# Patient Record
Sex: MALE | Race: WHITE | Employment: OTHER | URBAN - METROPOLITAN AREA
[De-identification: names, ages, dates, MRNs, and addresses within clinical notes are randomized per-mention and may not be internally consistent; named-entity substitution may affect disease eponyms.]

---

## 2018-05-11 ENCOUNTER — OFFICE VISIT (OUTPATIENT)
Dept: NEUROLOGY | Age: 53
End: 2018-05-11
Payer: COMMERCIAL

## 2018-05-11 VITALS
SYSTOLIC BLOOD PRESSURE: 129 MMHG | OXYGEN SATURATION: 94 % | BODY MASS INDEX: 33.19 KG/M2 | TEMPERATURE: 97.8 F | RESPIRATION RATE: 18 BRPM | DIASTOLIC BLOOD PRESSURE: 86 MMHG | WEIGHT: 219 LBS | HEIGHT: 68 IN | HEART RATE: 89 BPM

## 2018-05-11 DIAGNOSIS — G35 MS (MULTIPLE SCLEROSIS) (HCC): Primary | ICD-10-CM

## 2018-05-11 PROCEDURE — 3017F COLORECTAL CA SCREEN DOC REV: CPT | Performed by: PSYCHIATRY & NEUROLOGY

## 2018-05-11 PROCEDURE — 1036F TOBACCO NON-USER: CPT | Performed by: PSYCHIATRY & NEUROLOGY

## 2018-05-11 PROCEDURE — G8417 CALC BMI ABV UP PARAM F/U: HCPCS | Performed by: PSYCHIATRY & NEUROLOGY

## 2018-05-11 PROCEDURE — G8427 DOCREV CUR MEDS BY ELIG CLIN: HCPCS | Performed by: PSYCHIATRY & NEUROLOGY

## 2018-05-11 PROCEDURE — 99214 OFFICE O/P EST MOD 30 MIN: CPT | Performed by: PSYCHIATRY & NEUROLOGY

## 2018-05-11 RX ORDER — LEVOCETIRIZINE DIHYDROCHLORIDE 5 MG/1
5 TABLET, FILM COATED ORAL NIGHTLY
COMMUNITY

## 2018-06-20 ENCOUNTER — TELEPHONE (OUTPATIENT)
Dept: NEUROLOGY | Age: 53
End: 2018-06-20

## 2018-07-11 ENCOUNTER — HOSPITAL ENCOUNTER (OUTPATIENT)
Age: 53
Discharge: HOME | End: 2018-07-11
Payer: COMMERCIAL

## 2018-07-11 DIAGNOSIS — G35 MS (MULTIPLE SCLEROSIS) (HCC): ICD-10-CM

## 2018-07-11 LAB
ALBUMIN SERPL-MCNC: 4.3 G/DL (ref 3.5–5.2)
ALP BLD-CCNC: 64 U/L (ref 40–129)
ALT SERPL-CCNC: 36 U/L (ref 0–40)
ANION GAP SERPL CALCULATED.3IONS-SCNC: 11 MMOL/L (ref 7–16)
AST SERPL-CCNC: 49 U/L (ref 0–39)
BASOPHILS ABSOLUTE: 0.05 E9/L (ref 0–0.2)
BASOPHILS RELATIVE PERCENT: 1 % (ref 0–2)
BILIRUB SERPL-MCNC: 1.5 MG/DL (ref 0–1.2)
BUN BLDV-MCNC: 8 MG/DL (ref 6–20)
CALCIUM SERPL-MCNC: 9.1 MG/DL (ref 8.6–10.2)
CHLORIDE BLD-SCNC: 101 MMOL/L (ref 98–107)
CO2: 28 MMOL/L (ref 22–29)
CREAT SERPL-MCNC: 0.7 MG/DL (ref 0.7–1.2)
EOSINOPHILS ABSOLUTE: 0.13 E9/L (ref 0.05–0.5)
EOSINOPHILS RELATIVE PERCENT: 2.7 % (ref 0–6)
GFR AFRICAN AMERICAN: >60
GFR NON-AFRICAN AMERICAN: >60 ML/MIN/1.73
GLUCOSE BLD-MCNC: 127 MG/DL (ref 74–109)
HCT VFR BLD CALC: 42.6 % (ref 37–54)
HEMOGLOBIN: 14.2 G/DL (ref 12.5–16.5)
IMMATURE GRANULOCYTES #: 0.04 E9/L
IMMATURE GRANULOCYTES %: 0.8 % (ref 0–5)
LYMPHOCYTES ABSOLUTE: 0.72 E9/L (ref 1.5–4)
LYMPHOCYTES RELATIVE PERCENT: 15 % (ref 20–42)
MCH RBC QN AUTO: 32.2 PG (ref 26–35)
MCHC RBC AUTO-ENTMCNC: 33.3 % (ref 32–34.5)
MCV RBC AUTO: 96.6 FL (ref 80–99.9)
MONOCYTES ABSOLUTE: 0.58 E9/L (ref 0.1–0.95)
MONOCYTES RELATIVE PERCENT: 12.1 % (ref 2–12)
NEUTROPHILS ABSOLUTE: 3.28 E9/L (ref 1.8–7.3)
NEUTROPHILS RELATIVE PERCENT: 68.4 % (ref 43–80)
PDW BLD-RTO: 13.1 FL (ref 11.5–15)
PLATELET # BLD: 138 E9/L (ref 130–450)
PMV BLD AUTO: 10.9 FL (ref 7–12)
POTASSIUM SERPL-SCNC: 4.2 MMOL/L (ref 3.5–5)
RBC # BLD: 4.41 E12/L (ref 3.8–5.8)
SODIUM BLD-SCNC: 140 MMOL/L (ref 132–146)
TOTAL PROTEIN: 7.3 G/DL (ref 6.4–8.3)
WBC # BLD: 4.8 E9/L (ref 4.5–11.5)

## 2018-07-11 PROCEDURE — 80053 COMPREHEN METABOLIC PANEL: CPT

## 2018-07-11 PROCEDURE — 36415 COLL VENOUS BLD VENIPUNCTURE: CPT

## 2018-07-11 PROCEDURE — 85025 COMPLETE CBC W/AUTO DIFF WBC: CPT

## 2018-11-15 ENCOUNTER — OFFICE VISIT (OUTPATIENT)
Dept: NEUROLOGY | Age: 53
End: 2018-11-15
Payer: COMMERCIAL

## 2018-11-15 VITALS
DIASTOLIC BLOOD PRESSURE: 71 MMHG | OXYGEN SATURATION: 96 % | TEMPERATURE: 96.3 F | HEART RATE: 82 BPM | BODY MASS INDEX: 35.16 KG/M2 | RESPIRATION RATE: 18 BRPM | SYSTOLIC BLOOD PRESSURE: 127 MMHG | HEIGHT: 67 IN | WEIGHT: 224 LBS

## 2018-11-15 DIAGNOSIS — G35 MS (MULTIPLE SCLEROSIS) (HCC): Primary | ICD-10-CM

## 2018-11-15 PROCEDURE — G8427 DOCREV CUR MEDS BY ELIG CLIN: HCPCS | Performed by: PSYCHIATRY & NEUROLOGY

## 2018-11-15 PROCEDURE — G8417 CALC BMI ABV UP PARAM F/U: HCPCS | Performed by: PSYCHIATRY & NEUROLOGY

## 2018-11-15 PROCEDURE — 1036F TOBACCO NON-USER: CPT | Performed by: PSYCHIATRY & NEUROLOGY

## 2018-11-15 PROCEDURE — 99214 OFFICE O/P EST MOD 30 MIN: CPT | Performed by: PSYCHIATRY & NEUROLOGY

## 2018-11-15 PROCEDURE — G8484 FLU IMMUNIZE NO ADMIN: HCPCS | Performed by: PSYCHIATRY & NEUROLOGY

## 2018-11-15 PROCEDURE — 3017F COLORECTAL CA SCREEN DOC REV: CPT | Performed by: PSYCHIATRY & NEUROLOGY

## 2018-12-03 ENCOUNTER — TELEPHONE (OUTPATIENT)
Dept: NEUROLOGY | Age: 53
End: 2018-12-03

## 2018-12-14 ENCOUNTER — TELEPHONE (OUTPATIENT)
Dept: NEUROLOGY | Age: 53
End: 2018-12-14

## 2018-12-14 DIAGNOSIS — G35 MS (MULTIPLE SCLEROSIS) (HCC): ICD-10-CM

## 2018-12-14 DIAGNOSIS — R26.81 UNSTEADINESS: Primary | ICD-10-CM

## 2018-12-19 NOTE — TELEPHONE ENCOUNTER
Pt to use [facility] On [address] Ph [phone]. Order will be faxed to [phone].   Electronically signed by Felipe Ackerman on 12/19/18 at 11:51 AM

## 2019-01-09 ENCOUNTER — HOSPITAL ENCOUNTER (OUTPATIENT)
Dept: MRI IMAGING | Age: 54
Discharge: HOME | End: 2019-01-11
Payer: COMMERCIAL

## 2019-01-09 DIAGNOSIS — G35 MS (MULTIPLE SCLEROSIS) (HCC): ICD-10-CM

## 2019-01-09 PROCEDURE — 72156 MRI NECK SPINE W/O & W/DYE: CPT

## 2019-01-09 PROCEDURE — A9579 GAD-BASE MR CONTRAST NOS,1ML: HCPCS | Performed by: RADIOLOGY

## 2019-01-09 PROCEDURE — 6360000004 HC RX CONTRAST MEDICATION: Performed by: RADIOLOGY

## 2019-01-09 PROCEDURE — 70553 MRI BRAIN STEM W/O & W/DYE: CPT

## 2019-01-09 RX ADMIN — GADOTERIDOL 20 ML: 279.3 INJECTION, SOLUTION INTRAVENOUS at 16:58

## 2019-01-29 ENCOUNTER — HOSPITAL ENCOUNTER (OUTPATIENT)
Age: 54
Discharge: HOME | End: 2019-01-29
Payer: COMMERCIAL

## 2019-01-29 DIAGNOSIS — G35 MS (MULTIPLE SCLEROSIS) (HCC): ICD-10-CM

## 2019-01-29 LAB
ALBUMIN SERPL-MCNC: 4.4 G/DL (ref 3.5–5.2)
ALP BLD-CCNC: 64 U/L (ref 40–129)
ALT SERPL-CCNC: 62 U/L (ref 0–40)
ANION GAP SERPL CALCULATED.3IONS-SCNC: 15 MMOL/L (ref 7–16)
AST SERPL-CCNC: 73 U/L (ref 0–39)
BASOPHILS ABSOLUTE: 0.07 E9/L (ref 0–0.2)
BASOPHILS RELATIVE PERCENT: 1.3 % (ref 0–2)
BILIRUB SERPL-MCNC: 1 MG/DL (ref 0–1.2)
BUN BLDV-MCNC: 12 MG/DL (ref 6–20)
CALCIUM SERPL-MCNC: 9.3 MG/DL (ref 8.6–10.2)
CHLORIDE BLD-SCNC: 99 MMOL/L (ref 98–107)
CO2: 24 MMOL/L (ref 22–29)
CREAT SERPL-MCNC: 0.8 MG/DL (ref 0.7–1.2)
EOSINOPHILS ABSOLUTE: 0.14 E9/L (ref 0.05–0.5)
EOSINOPHILS RELATIVE PERCENT: 2.6 % (ref 0–6)
GFR AFRICAN AMERICAN: >60
GFR NON-AFRICAN AMERICAN: >60 ML/MIN/1.73
GLUCOSE BLD-MCNC: 90 MG/DL (ref 74–99)
HCT VFR BLD CALC: 45.1 % (ref 37–54)
HEMOGLOBIN: 15.3 G/DL (ref 12.5–16.5)
IMMATURE GRANULOCYTES #: 0.02 E9/L
IMMATURE GRANULOCYTES %: 0.4 % (ref 0–5)
LYMPHOCYTES ABSOLUTE: 0.87 E9/L (ref 1.5–4)
LYMPHOCYTES RELATIVE PERCENT: 15.9 % (ref 20–42)
MCH RBC QN AUTO: 33.6 PG (ref 26–35)
MCHC RBC AUTO-ENTMCNC: 33.9 % (ref 32–34.5)
MCV RBC AUTO: 98.9 FL (ref 80–99.9)
MONOCYTES ABSOLUTE: 0.83 E9/L (ref 0.1–0.95)
MONOCYTES RELATIVE PERCENT: 15.2 % (ref 2–12)
NEUTROPHILS ABSOLUTE: 3.53 E9/L (ref 1.8–7.3)
NEUTROPHILS RELATIVE PERCENT: 64.6 % (ref 43–80)
PDW BLD-RTO: 13.4 FL (ref 11.5–15)
PLATELET # BLD: 190 E9/L (ref 130–450)
PMV BLD AUTO: 10.4 FL (ref 7–12)
POTASSIUM SERPL-SCNC: 4.4 MMOL/L (ref 3.5–5)
RBC # BLD: 4.56 E12/L (ref 3.8–5.8)
SODIUM BLD-SCNC: 138 MMOL/L (ref 132–146)
TOTAL PROTEIN: 7.3 G/DL (ref 6.4–8.3)
WBC # BLD: 5.5 E9/L (ref 4.5–11.5)

## 2019-01-29 PROCEDURE — 85025 COMPLETE CBC W/AUTO DIFF WBC: CPT

## 2019-01-29 PROCEDURE — 36415 COLL VENOUS BLD VENIPUNCTURE: CPT

## 2019-01-29 PROCEDURE — 80053 COMPREHEN METABOLIC PANEL: CPT

## 2019-06-04 ENCOUNTER — OFFICE VISIT (OUTPATIENT)
Dept: NEUROLOGY | Age: 54
End: 2019-06-04
Payer: COMMERCIAL

## 2019-06-04 VITALS
RESPIRATION RATE: 18 BRPM | HEIGHT: 67 IN | HEART RATE: 88 BPM | DIASTOLIC BLOOD PRESSURE: 83 MMHG | SYSTOLIC BLOOD PRESSURE: 136 MMHG | WEIGHT: 222 LBS | BODY MASS INDEX: 34.84 KG/M2 | OXYGEN SATURATION: 97 % | TEMPERATURE: 97 F

## 2019-06-04 DIAGNOSIS — G35 MS (MULTIPLE SCLEROSIS) (HCC): Primary | ICD-10-CM

## 2019-06-04 PROCEDURE — 3017F COLORECTAL CA SCREEN DOC REV: CPT | Performed by: PSYCHIATRY & NEUROLOGY

## 2019-06-04 PROCEDURE — G8417 CALC BMI ABV UP PARAM F/U: HCPCS | Performed by: PSYCHIATRY & NEUROLOGY

## 2019-06-04 PROCEDURE — 1036F TOBACCO NON-USER: CPT | Performed by: PSYCHIATRY & NEUROLOGY

## 2019-06-04 PROCEDURE — G8427 DOCREV CUR MEDS BY ELIG CLIN: HCPCS | Performed by: PSYCHIATRY & NEUROLOGY

## 2019-06-04 PROCEDURE — 99214 OFFICE O/P EST MOD 30 MIN: CPT | Performed by: PSYCHIATRY & NEUROLOGY

## 2019-06-04 NOTE — PROGRESS NOTES
Jimmie Cowden is a 48 y.o. 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 He remains an excellent historian. He continues well controlled on Tecfidera 240 mg twice daily. He denies any issues with that drug. He still displays primarily spastic paraparesis. He again reports no additional weakness, numbness, pains or other neurological deficits. He has not used a cane in several years. He again denies any tripping or falling. He is not taking baclofen. Medically he reports no additional issues. He is eating and sleeping well. He remains good spirits. The patient remains active and still exercises regularly. Review of systems is otherwise unremarkable. Objective:     /83 (Site: Left Upper Arm, Position: Sitting, Cuff Size: Medium Adult)   Pulse 88   Temp 97 °F (36.1 °C) (Oral)   Resp 18   Ht 5' 7\" (1.702 m)   Wt 222 lb (100.7 kg)   SpO2 97%   BMI 34.77 kg/m²    Afebrile     Head: Unremarkable   Neck: No adenopathy, no carotid bruit, no JVD, supple, symmetrical, trachea midline and thyroid not enlarged, symmetric, no tenderness/mass/nodules  Lungs: Clear to auscultation   Heart: Regular rate and rhythm, S1, S2 normal, no murmurs, click, rub or gallop  Extremities: Atrophy of both legs  Skin: Color, texture, turgor normal; no rashes or lesions     Mental Status: Alert, oriented, thought content appropriate---with intact memories; he remains pleasant  Speech:  Without dysarthria or aphasia    Cranial Nerves:  I: smell    II: visual acuity     II: visual fields Full to confrontation   II: pupils MARIZOL   III,VII: ptosis None   III,IV,VI: extraocular muscles  Full ROM   V: mastication Normal   V: facial light touch sensation  Normal   V,VII: corneal reflex  Present   VII: facial muscle function - upper  Normal   VII: facial muscle function - lower Normal   VIII: hearing Normal   IX: soft palate elevation  Normal   IX,X: gag reflex Present   XI: trapezius strength  5/5   XI: sternocleidomastoid strength 5/5   XI: neck extension strength  5/5   XII: tongue strength  Normal     No red desaturation  No Daniel pupil  L optic disc pallor again    Motor:  5/5 throughout  Minimal spastic legs     Sensory:  Light touch and pinprick remain normal    Coordination:   Intact finger to nose, heel to shin and rapid alternating movements    Gait:  He displays a moderate spastic paraparetic gait    DTR:   BE reflexes in arms   Right Quadriceps reflex 2+/4  Left Quadriceps reflex 2+/4  Right Achilles reflex 0/4  Left Achilles reflex 0/4    His neurological examination remains unchanged    Laboratory/Radiology:     CBC with differential reveals an absolute lymphocyte count of 870  5 months ago    BERTIN virus was negative again    MRIs of his cervical spine revealed extensive lesion load with moderate degenerative joint disease and cord compression    MRIs of his brain revealed slightly increased T2 flair but no other abnormalities. Assessment:     The patient suffers from secondary chronic progressive MS --- his EDSS remains 6.0 ----from his spastic paraparesis. He continues well without using any assistive device or baclofen. I again find no changes on exam.  He will continue Tecfidera. Fortunately, there have been no additional falls. His MRIs are essentially unchanged. However, repeat MRIs of his brain or in order next year. His lumbosacral radiculopathy and carpal tunnel syndrome have not produced additional difficulties. Medically he remains stable. Plan:     He will continue Tecfidera at 240 mg twice daily     Repeat CBC with differential, BERTIN virus assay and CMP will be drawn    He will return in 6 months    He will call if any problems arise    I spent 29 minutes with the patient with over 50 % spent in counseling and disease management. All patient issues were addressed and all questions were answered.      Nithya Srivastava Jr  10:59 AM  6/4/2019

## 2019-09-18 ENCOUNTER — HOSPITAL ENCOUNTER (OUTPATIENT)
Age: 54
Discharge: HOME | End: 2019-09-18
Payer: COMMERCIAL

## 2019-09-18 DIAGNOSIS — G35 MS (MULTIPLE SCLEROSIS) (HCC): ICD-10-CM

## 2019-09-18 LAB
ALBUMIN SERPL-MCNC: 4.2 G/DL (ref 3.5–5.2)
ALP BLD-CCNC: 60 U/L (ref 40–129)
ALT SERPL-CCNC: 18 U/L (ref 0–40)
ANION GAP SERPL CALCULATED.3IONS-SCNC: 12 MMOL/L (ref 7–16)
AST SERPL-CCNC: 37 U/L (ref 0–39)
BASOPHILS ABSOLUTE: 0.05 E9/L (ref 0–0.2)
BASOPHILS RELATIVE PERCENT: 0.8 % (ref 0–2)
BILIRUB SERPL-MCNC: 1.1 MG/DL (ref 0–1.2)
BUN BLDV-MCNC: 10 MG/DL (ref 6–20)
CALCIUM SERPL-MCNC: 9.7 MG/DL (ref 8.6–10.2)
CHLORIDE BLD-SCNC: 103 MMOL/L (ref 98–107)
CO2: 27 MMOL/L (ref 22–29)
CREAT SERPL-MCNC: 0.8 MG/DL (ref 0.7–1.2)
EOSINOPHILS ABSOLUTE: 0.12 E9/L (ref 0.05–0.5)
EOSINOPHILS RELATIVE PERCENT: 2 % (ref 0–6)
GFR AFRICAN AMERICAN: >60
GFR NON-AFRICAN AMERICAN: >60 ML/MIN/1.73
GLUCOSE BLD-MCNC: 97 MG/DL (ref 74–99)
HCT VFR BLD CALC: 44.9 % (ref 37–54)
HEMOGLOBIN: 15 G/DL (ref 12.5–16.5)
IMMATURE GRANULOCYTES #: 0.06 E9/L
IMMATURE GRANULOCYTES %: 1 % (ref 0–5)
LYMPHOCYTES ABSOLUTE: 0.92 E9/L (ref 1.5–4)
LYMPHOCYTES RELATIVE PERCENT: 15.1 % (ref 20–42)
MCH RBC QN AUTO: 33 PG (ref 26–35)
MCHC RBC AUTO-ENTMCNC: 33.4 % (ref 32–34.5)
MCV RBC AUTO: 98.9 FL (ref 80–99.9)
MONOCYTES ABSOLUTE: 0.76 E9/L (ref 0.1–0.95)
MONOCYTES RELATIVE PERCENT: 12.5 % (ref 2–12)
NEUTROPHILS ABSOLUTE: 4.18 E9/L (ref 1.8–7.3)
NEUTROPHILS RELATIVE PERCENT: 68.6 % (ref 43–80)
PDW BLD-RTO: 13.3 FL (ref 11.5–15)
PLATELET # BLD: 171 E9/L (ref 130–450)
PMV BLD AUTO: 10.6 FL (ref 7–12)
POTASSIUM SERPL-SCNC: 4.4 MMOL/L (ref 3.5–5)
RBC # BLD: 4.54 E12/L (ref 3.8–5.8)
SODIUM BLD-SCNC: 142 MMOL/L (ref 132–146)
TOTAL PROTEIN: 7.6 G/DL (ref 6.4–8.3)
WBC # BLD: 6.1 E9/L (ref 4.5–11.5)

## 2019-09-18 PROCEDURE — 36415 COLL VENOUS BLD VENIPUNCTURE: CPT

## 2019-09-18 PROCEDURE — 85025 COMPLETE CBC W/AUTO DIFF WBC: CPT

## 2019-09-18 PROCEDURE — 80053 COMPREHEN METABOLIC PANEL: CPT

## 2019-09-18 PROCEDURE — 87798 DETECT AGENT NOS DNA AMP: CPT

## 2019-09-24 LAB
Lab: NORMAL
REPORT: NORMAL
THIS TEST SENT TO: NORMAL

## 2019-12-03 ENCOUNTER — OFFICE VISIT (OUTPATIENT)
Dept: NEUROLOGY | Age: 54
End: 2019-12-03
Payer: COMMERCIAL

## 2019-12-03 VITALS
RESPIRATION RATE: 17 BRPM | SYSTOLIC BLOOD PRESSURE: 136 MMHG | DIASTOLIC BLOOD PRESSURE: 87 MMHG | BODY MASS INDEX: 29.82 KG/M2 | WEIGHT: 190 LBS | OXYGEN SATURATION: 96 % | HEART RATE: 92 BPM | HEIGHT: 67 IN

## 2019-12-03 DIAGNOSIS — G35 MULTIPLE SCLEROSIS (HCC): ICD-10-CM

## 2019-12-03 PROCEDURE — 1036F TOBACCO NON-USER: CPT | Performed by: PSYCHIATRY & NEUROLOGY

## 2019-12-03 PROCEDURE — G8484 FLU IMMUNIZE NO ADMIN: HCPCS | Performed by: PSYCHIATRY & NEUROLOGY

## 2019-12-03 PROCEDURE — 99214 OFFICE O/P EST MOD 30 MIN: CPT | Performed by: PSYCHIATRY & NEUROLOGY

## 2019-12-03 PROCEDURE — G8417 CALC BMI ABV UP PARAM F/U: HCPCS | Performed by: PSYCHIATRY & NEUROLOGY

## 2019-12-03 PROCEDURE — G8427 DOCREV CUR MEDS BY ELIG CLIN: HCPCS | Performed by: PSYCHIATRY & NEUROLOGY

## 2019-12-03 PROCEDURE — 3017F COLORECTAL CA SCREEN DOC REV: CPT | Performed by: PSYCHIATRY & NEUROLOGY

## 2019-12-03 RX ORDER — DIMETHYL FUMARATE 240 MG/1
CAPSULE ORAL
Qty: 180 CAPSULE | Refills: 3 | Status: SHIPPED
Start: 2019-12-03 | End: 2020-06-08 | Stop reason: SDUPTHER

## 2020-02-20 ENCOUNTER — HOSPITAL ENCOUNTER (EMERGENCY)
Age: 55
Discharge: HOME | End: 2020-02-20
Attending: FAMILY MEDICINE
Payer: COMMERCIAL

## 2020-02-20 VITALS
HEART RATE: 104 BPM | RESPIRATION RATE: 16 BRPM | WEIGHT: 200 LBS | HEIGHT: 66 IN | BODY MASS INDEX: 32.14 KG/M2 | SYSTOLIC BLOOD PRESSURE: 130 MMHG | TEMPERATURE: 98 F | OXYGEN SATURATION: 98 % | DIASTOLIC BLOOD PRESSURE: 82 MMHG

## 2020-02-20 PROCEDURE — 99282 EMERGENCY DEPT VISIT SF MDM: CPT

## 2020-02-20 RX ORDER — ACETAMINOPHEN 500 MG
1000 TABLET ORAL EVERY 8 HOURS PRN
Qty: 50 TABLET | Refills: 0 | Status: SHIPPED | OUTPATIENT
Start: 2020-02-20 | End: 2020-12-08 | Stop reason: SDUPTHER

## 2020-02-20 RX ORDER — AMOXICILLIN AND CLAVULANATE POTASSIUM 875; 125 MG/1; MG/1
1 TABLET, FILM COATED ORAL 2 TIMES DAILY
Qty: 20 TABLET | Refills: 0 | Status: SHIPPED | OUTPATIENT
Start: 2020-02-20 | End: 2020-03-01

## 2020-02-20 RX ORDER — ACETAMINOPHEN 500 MG
500 TABLET ORAL ONCE
COMMUNITY

## 2020-02-20 RX ORDER — IBUPROFEN 400 MG/1
400 TABLET ORAL EVERY 8 HOURS PRN
Qty: 12 TABLET | Refills: 0 | Status: SHIPPED | OUTPATIENT
Start: 2020-02-20

## 2020-02-20 ASSESSMENT — PAIN DESCRIPTION - ONSET: ONSET: ON-GOING

## 2020-02-20 ASSESSMENT — PAIN DESCRIPTION - PAIN TYPE: TYPE: ACUTE PAIN

## 2020-02-20 ASSESSMENT — PAIN DESCRIPTION - LOCATION: LOCATION: EAR

## 2020-02-20 ASSESSMENT — PAIN DESCRIPTION - ORIENTATION: ORIENTATION: LEFT

## 2020-02-20 ASSESSMENT — PAIN SCALES - GENERAL: PAINLEVEL_OUTOF10: 9

## 2020-02-20 ASSESSMENT — PAIN DESCRIPTION - DESCRIPTORS: DESCRIPTORS: CONSTANT

## 2020-02-20 NOTE — ED PROVIDER NOTES
Department of Emergency Medicine   ED  Provider Note  Admit Date/RoomTime: 2/20/2020  9:33 AM  ED Room: 08/08    Chief Complaint   Facial Pain (left ear/ facial pain- ongoing x 1 month- pain more severe through last night)    History of Present Illness   Source of history provided by:  patient and relative(s). History/Exam Limitations: none. Barbra Mortimer is a 47 y.o. old male who has a past medical history of:   Past Medical History:   Diagnosis Date    Multiple sclerosis ([facility] [address]     Vision abnormalities     presents to the emergency department by private vehicle and ambulatory, for left lower jaw and ear  pain, which occured 1 month(s) prior to arrival.  Since onset the symptoms have been constant and gradually worsening and moderate in severity. Worsened by  chewing and improved by nothing. Associated Signs & Symptoms:  Facial pain. Onset:       Spontaneous:   yes. Following Trauma:   no.     Previous Caries:   no.     Recent Dental Procedure:   no.     ROS    Pertinent positives and negatives are stated within HPI, all other systems reviewed and are negative. .    Past Surgical History:  has a past surgical history that includes Wrist surgery. Social History:  reports that he has never smoked. He has never used smokeless tobacco. He reports previous alcohol use. He reports that he does not use drugs. Family History: family history includes Cancer in his mother; High Blood Pressure in his mother. Allergies: Seasonal    Physical Exam           ED Triage Vitals   BP Temp Temp Source Pulse Resp SpO2 Height Weight   02/20/20 0918 02/20/20 0918 02/20/20 0918 02/20/20 0918 02/20/20 0918 02/20/20 0918 02/20/20 0956 02/20/20 0956   130/82 98 °F (36.7 °C) Temporal 110 16 98 % 5' 6\" (1.676 m) 200 lb (90.7 kg)      Oxygen Saturation Interpretation: Normal.    · Constitutional:  Alert, development consistent with age. · HEENT:  NC/NT. Airway patent. Ears normal bilaterally.    · Neck: Herminio Hernandez MD   DD: 2/20/20  **This report was transcribed using voice recognition software. Every effort was made to ensure accuracy; however, inadvertent computerized transcription errors may be present.   END OF ED PROVIDER NOTE        Bright Jones MD  02/20/20 [id number]

## 2020-06-02 ENCOUNTER — HOSPITAL ENCOUNTER (OUTPATIENT)
Age: 55
Discharge: HOME | End: 2020-06-02
Payer: COMMERCIAL

## 2020-06-02 LAB
ALBUMIN SERPL-MCNC: 4.9 G/DL (ref 3.5–5.2)
ALP BLD-CCNC: 69 U/L (ref 40–129)
ALT SERPL-CCNC: 24 U/L (ref 0–40)
ANION GAP SERPL CALCULATED.3IONS-SCNC: 15 MMOL/L (ref 7–16)
AST SERPL-CCNC: 39 U/L (ref 0–39)
BASOPHILS ABSOLUTE: 0.06 E9/L (ref 0–0.2)
BASOPHILS RELATIVE PERCENT: 0.9 % (ref 0–2)
BILIRUB SERPL-MCNC: 1.3 MG/DL (ref 0–1.2)
BUN BLDV-MCNC: 11 MG/DL (ref 6–20)
CALCIUM SERPL-MCNC: 10 MG/DL (ref 8.6–10.2)
CHLORIDE BLD-SCNC: 100 MMOL/L (ref 98–107)
CO2: 27 MMOL/L (ref 22–29)
CREAT SERPL-MCNC: 0.8 MG/DL (ref 0.7–1.2)
EOSINOPHILS ABSOLUTE: 0.14 E9/L (ref 0.05–0.5)
EOSINOPHILS RELATIVE PERCENT: 2.2 % (ref 0–6)
GFR AFRICAN AMERICAN: >60
GFR NON-AFRICAN AMERICAN: >60 ML/MIN/1.73
GLUCOSE BLD-MCNC: 93 MG/DL (ref 74–99)
HCT VFR BLD CALC: 48.2 % (ref 37–54)
HEMOGLOBIN: 16.5 G/DL (ref 12.5–16.5)
IMMATURE GRANULOCYTES #: 0.02 E9/L
IMMATURE GRANULOCYTES %: 0.3 % (ref 0–5)
LYMPHOCYTES ABSOLUTE: 0.97 E9/L (ref 1.5–4)
LYMPHOCYTES RELATIVE PERCENT: 15.1 % (ref 20–42)
MCH RBC QN AUTO: 34 PG (ref 26–35)
MCHC RBC AUTO-ENTMCNC: 34.2 % (ref 32–34.5)
MCV RBC AUTO: 99.2 FL (ref 80–99.9)
MONOCYTES ABSOLUTE: 0.66 E9/L (ref 0.1–0.95)
MONOCYTES RELATIVE PERCENT: 10.3 % (ref 2–12)
NEUTROPHILS ABSOLUTE: 4.56 E9/L (ref 1.8–7.3)
NEUTROPHILS RELATIVE PERCENT: 71.2 % (ref 43–80)
PDW BLD-RTO: 13.1 FL (ref 11.5–15)
PLATELET # BLD: 186 E9/L (ref 130–450)
PMV BLD AUTO: 10.8 FL (ref 7–12)
POTASSIUM SERPL-SCNC: 4.8 MMOL/L (ref 3.5–5)
RBC # BLD: 4.86 E12/L (ref 3.8–5.8)
SODIUM BLD-SCNC: 142 MMOL/L (ref 132–146)
TOTAL PROTEIN: 8.3 G/DL (ref 6.4–8.3)
WBC # BLD: 6.4 E9/L (ref 4.5–11.5)

## 2020-06-02 PROCEDURE — 80053 COMPREHEN METABOLIC PANEL: CPT

## 2020-06-02 PROCEDURE — 36415 COLL VENOUS BLD VENIPUNCTURE: CPT

## 2020-06-02 PROCEDURE — 85025 COMPLETE CBC W/AUTO DIFF WBC: CPT

## 2020-06-02 PROCEDURE — 87798 DETECT AGENT NOS DNA AMP: CPT

## 2020-06-08 ENCOUNTER — OFFICE VISIT (OUTPATIENT)
Dept: NEUROLOGY | Age: 55
End: 2020-06-08
Payer: COMMERCIAL

## 2020-06-08 VITALS — BODY MASS INDEX: 33.32 KG/M2 | TEMPERATURE: 97.4 F | WEIGHT: 200 LBS | HEIGHT: 65 IN | RESPIRATION RATE: 18 BRPM

## 2020-06-08 PROCEDURE — 99215 OFFICE O/P EST HI 40 MIN: CPT | Performed by: PSYCHIATRY & NEUROLOGY

## 2020-06-08 PROCEDURE — 1036F TOBACCO NON-USER: CPT | Performed by: PSYCHIATRY & NEUROLOGY

## 2020-06-08 PROCEDURE — G8427 DOCREV CUR MEDS BY ELIG CLIN: HCPCS | Performed by: PSYCHIATRY & NEUROLOGY

## 2020-06-08 PROCEDURE — 3017F COLORECTAL CA SCREEN DOC REV: CPT | Performed by: PSYCHIATRY & NEUROLOGY

## 2020-06-08 PROCEDURE — G8417 CALC BMI ABV UP PARAM F/U: HCPCS | Performed by: PSYCHIATRY & NEUROLOGY

## 2020-06-08 RX ORDER — DIMETHYL FUMARATE 240 MG/1
CAPSULE ORAL
Qty: 180 CAPSULE | Refills: 3 | Status: SHIPPED
Start: 2020-06-08 | End: 2021-01-19 | Stop reason: SDUPTHER

## 2020-06-10 LAB
Lab: NORMAL
REPORT: NORMAL
THIS TEST SENT TO: NORMAL

## 2020-10-14 ENCOUNTER — TELEPHONE (OUTPATIENT)
Dept: NEUROLOGY | Age: 55
End: 2020-10-14

## 2020-10-14 NOTE — TELEPHONE ENCOUNTER
Tecfidera approved 9/13/20-10/14/[facility]   Electronically signed by Markus Weeks on 10/14/20 at 8:47 AM EDT

## 2020-12-08 ENCOUNTER — VIRTUAL VISIT (OUTPATIENT)
Dept: NEUROLOGY | Age: 55
End: 2020-12-08
Payer: COMMERCIAL

## 2020-12-08 PROCEDURE — 99443 PR PHYS/QHP TELEPHONE EVALUATION 21-30 MIN: CPT | Performed by: PSYCHIATRY & NEUROLOGY

## 2020-12-08 RX ORDER — OXYBUTYNIN CHLORIDE 5 MG/1
TABLET ORAL
COMMUNITY
Start: 2020-11-27 | End: 2021-06-08

## 2020-12-08 NOTE — PROGRESS NOTES
Cathleen Sauceda is a 47 y.o. 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 He remains an excellent historian. This visit was by telephone. He continues well on Tecfidera 240 mg twice daily. Additional medications include oxybutynin, multivitamins, nasal spray, vitamin D and zinc.    He again denies any issues with dimethyl fumarate. He still displays spastic paraparesis, but reporting additional weakness, numbness, pains or other neurological deficits. There remain no cognitive deficits, speech or swallowing difficulties, headaches, other pains, incontinence or loss of consciousness. He has not used a cane in years. He has not fallen or tripped. He is no longer taking muscle relaxants. Medically he reports no issues. He continues eating and sleeping well. He remains in good spirits, despite the COVID-19 lockdown. He is practicing proper precautions. The patient remains active outside the home. He continues fatiguing in the evening hours. Review of systems is otherwise unremarkable. Objective:     He is afebrile and in no acute distress. He is breathing comfortably, without chest pain or shortness of breath. He denies any chest palpitations. He is alert, cooperative and very pleasant. His skin is unremarkable. His abdomen is soft and nontender. His limbs revealed no abnormalities. Neurological examination reveals an intact mental status. All memories are intact; I find no dysarthria or aphasia. He observes no cranial nerve abnormalities. He still notes some spasticity in his legs, but 5/5 strength throughout. There were no abnormal movements. He appreciates light touch in all limbs. He still walks with his spastic paraparetic gait. His neurological examination is reportedly unchanged. Laboratory/Radiology:     CBC with differential reveals an absolute lymphocyte count of 970 6 months ago. BERTIN virus was negative 6 months ago.     MRIs of his cervical spine revealed extensive lesion load with moderate degenerative joint disease and cord compression. MRIs of his brain revealed slightly increased T2 flair but no other abnormalities. Assessment:     The patient suffers from secondary chronic progressive MS --- his EDSS remains 6.0 ----with his spastic paraparesis. He continues well without assistive devices or muscle relaxants. He denies any changes. He will continue on Tecfidera. Fortunately, there have been no additional falls. His past MRIs are unchanged. We will repeat routine laboratory data and BERTIN virus assays. His lumbosacral radiculopathy and carpal tunnel syndrome remain asymptomatic. Medically he remains stable. Plan:     He will continue Tecfidera at 240 mg twice daily. BERTIN virus assay, CBC with differential and CMP were ordered    He will continue exercising as best he can during the lockdown    He will return in 6 months. He will call if any problems arise. I spent 30 minutes with the patient with over 50 % spent in counseling and disease management. All patient issues were addressed and all questions were answered. Ada Hess is a 47 y.o. male evaluated via telephone on 12/8/2020. Consent:  He and/or health care decision maker is aware that that he may receive a bill for this telephone service, depending on his insurance coverage, and has provided verbal consent to proceed--- yes. Documentation:  I communicated with the patient and/or health care decision maker about his multiple sclerosis. Details of this discussion including any medical advice provided per telephone. I affirmed this is a Patient Initiated Episode with an Established Patient who has not had a related appointment within my department in the past 7 days or scheduled within the next 24 hours. Again I spent 30 minutes with the patient.       Marbella Mo MD

## 2020-12-08 NOTE — PROGRESS NOTES
Ale Perea was read the following message We want to confirm that, for purposes of billing, this is a virtual visit with your provider for which we will submit a claim for reimbursement with your insurance company. You will be responsible for any copays, coinsurance amounts or other amounts not covered by your insurance company. If you do not accept this, unfortunately we will not be able to schedule or proceed with a virtual visit with the provider. Do you accept? Cedric Cortez responded Yes .

## 2021-01-19 DIAGNOSIS — G35 MULTIPLE SCLEROSIS (HCC): ICD-10-CM

## 2021-01-19 RX ORDER — DIMETHYL FUMARATE 240 MG/1
CAPSULE ORAL
Qty: 180 CAPSULE | Refills: 3 | Status: SHIPPED
Start: 2021-01-19 | End: 2021-05-25 | Stop reason: SDUPTHER

## 2021-03-03 ENCOUNTER — TELEPHONE (OUTPATIENT)
Dept: NEUROLOGY | Age: 56
End: 2021-03-03

## 2021-03-03 NOTE — TELEPHONE ENCOUNTER
Patient called back stating he does not want the generic per Dr. Colonel Layo MD he is to be on DEANGELO  Electronically signed by Ratna Hernandez on 3/3/21 at 3:36 PM EST

## 2021-03-23 ENCOUNTER — HOSPITAL ENCOUNTER (OUTPATIENT)
Age: 56
Discharge: HOME | End: 2021-03-23
Payer: COMMERCIAL

## 2021-03-23 DIAGNOSIS — G35 MULTIPLE SCLEROSIS (HCC): ICD-10-CM

## 2021-03-23 LAB
ALBUMIN SERPL-MCNC: 4.5 G/DL (ref 3.5–5.2)
ALP BLD-CCNC: 66 U/L (ref 40–129)
ALT SERPL-CCNC: 19 U/L (ref 0–40)
ANION GAP SERPL CALCULATED.3IONS-SCNC: 10 MMOL/L (ref 7–16)
AST SERPL-CCNC: 28 U/L (ref 0–39)
BASOPHILS ABSOLUTE: 0.05 E9/L (ref 0–0.2)
BASOPHILS RELATIVE PERCENT: 1 % (ref 0–2)
BILIRUB SERPL-MCNC: 1 MG/DL (ref 0–1.2)
BUN BLDV-MCNC: 12 MG/DL (ref 6–20)
CALCIUM SERPL-MCNC: 9.7 MG/DL (ref 8.6–10.2)
CHLORIDE BLD-SCNC: 100 MMOL/L (ref 98–107)
CO2: 28 MMOL/L (ref 22–29)
CREAT SERPL-MCNC: 1 MG/DL (ref 0.7–1.2)
EOSINOPHILS ABSOLUTE: 0.15 E9/L (ref 0.05–0.5)
EOSINOPHILS RELATIVE PERCENT: 3 % (ref 0–6)
GFR AFRICAN AMERICAN: >60
GFR NON-AFRICAN AMERICAN: >60 ML/MIN/1.73
GLUCOSE BLD-MCNC: 98 MG/DL (ref 74–99)
HCT VFR BLD CALC: 44.9 % (ref 37–54)
HEMOGLOBIN: 15.6 G/DL (ref 12.5–16.5)
IMMATURE GRANULOCYTES #: 0.02 E9/L
IMMATURE GRANULOCYTES %: 0.4 % (ref 0–5)
LYMPHOCYTES ABSOLUTE: 0.98 E9/L (ref 1.5–4)
LYMPHOCYTES RELATIVE PERCENT: 19.8 % (ref 20–42)
MCH RBC QN AUTO: 33.9 PG (ref 26–35)
MCHC RBC AUTO-ENTMCNC: 34.7 % (ref 32–34.5)
MCV RBC AUTO: 97.6 FL (ref 80–99.9)
MONOCYTES ABSOLUTE: 0.54 E9/L (ref 0.1–0.95)
MONOCYTES RELATIVE PERCENT: 10.9 % (ref 2–12)
NEUTROPHILS ABSOLUTE: 3.21 E9/L (ref 1.8–7.3)
NEUTROPHILS RELATIVE PERCENT: 64.9 % (ref 43–80)
PDW BLD-RTO: 12.1 FL (ref 11.5–15)
PLATELET # BLD: 175 E9/L (ref 130–450)
PMV BLD AUTO: 10 FL (ref 7–12)
POTASSIUM SERPL-SCNC: 4.5 MMOL/L (ref 3.5–5)
RBC # BLD: 4.6 E12/L (ref 3.8–5.8)
SODIUM BLD-SCNC: 138 MMOL/L (ref 132–146)
TOTAL PROTEIN: 7.6 G/DL (ref 6.4–8.3)
WBC # BLD: 5 E9/L (ref 4.5–11.5)

## 2021-03-23 PROCEDURE — 87798 DETECT AGENT NOS DNA AMP: CPT

## 2021-03-23 PROCEDURE — 36415 COLL VENOUS BLD VENIPUNCTURE: CPT

## 2021-03-23 PROCEDURE — 85025 COMPLETE CBC W/AUTO DIFF WBC: CPT

## 2021-03-23 PROCEDURE — 80053 COMPREHEN METABOLIC PANEL: CPT

## 2021-03-29 LAB
Lab: NORMAL
REPORT: NORMAL
THIS TEST SENT TO: NORMAL

## 2021-05-24 DIAGNOSIS — G35 MULTIPLE SCLEROSIS (HCC): ICD-10-CM

## 2021-05-25 RX ORDER — DIMETHYL FUMARATE 240 MG/1
CAPSULE ORAL
Qty: 180 CAPSULE | Refills: 3 | Status: SHIPPED
Start: 2021-05-25 | End: 2021-11-16 | Stop reason: ALTCHOICE

## 2021-06-08 ENCOUNTER — OFFICE VISIT (OUTPATIENT)
Dept: NEUROLOGY | Age: 56
End: 2021-06-08
Payer: COMMERCIAL

## 2021-06-08 VITALS
RESPIRATION RATE: 20 BRPM | SYSTOLIC BLOOD PRESSURE: 165 MMHG | WEIGHT: 200 LBS | BODY MASS INDEX: 30.31 KG/M2 | OXYGEN SATURATION: 99 % | TEMPERATURE: 97.4 F | HEART RATE: 94 BPM | DIASTOLIC BLOOD PRESSURE: 95 MMHG | HEIGHT: 68 IN

## 2021-06-08 DIAGNOSIS — G35 MULTIPLE SCLEROSIS (HCC): Primary | ICD-10-CM

## 2021-06-08 PROCEDURE — 3017F COLORECTAL CA SCREEN DOC REV: CPT | Performed by: PSYCHIATRY & NEUROLOGY

## 2021-06-08 PROCEDURE — 1036F TOBACCO NON-USER: CPT | Performed by: PSYCHIATRY & NEUROLOGY

## 2021-06-08 PROCEDURE — G8417 CALC BMI ABV UP PARAM F/U: HCPCS | Performed by: PSYCHIATRY & NEUROLOGY

## 2021-06-08 PROCEDURE — 99215 OFFICE O/P EST HI 40 MIN: CPT | Performed by: PSYCHIATRY & NEUROLOGY

## 2021-06-08 PROCEDURE — G8427 DOCREV CUR MEDS BY ELIG CLIN: HCPCS | Performed by: PSYCHIATRY & NEUROLOGY

## 2021-06-08 NOTE — PROGRESS NOTES
ptosis None   III,IV,VI: extraocular muscles  Full ROM   V: mastication Normal   V: facial light touch sensation  Normal   V,VII: corneal reflex  Present   VII: facial muscle function - upper  Normal   VII: facial muscle function - lower Normal   VIII: hearing Normal   IX: soft palate elevation  Normal   IX,X: gag reflex Present   XI: trapezius strength  5/5   XI: sternocleidomastoid strength 5/5   XI: neck extension strength  5/5   XII: tongue strength  Normal     His left optic disc pallor remain but without red desaturation or Daniel pupil. Motor: There remained 5/5 strength throughout, with moderate spasticity in both legs    Sensory:  Light touch and vibration continued normal    Coordination:   I found intact finger to nose, heel to shin and rapid alternating movements    Gait:  He again displayed a moderate spastic paraparetic gait    DTR:   Reflexes remained +2 at both quadriceps but absent otherwise    His neurological examination was unchanged    Laboratory/Radiology:     Recent CMP was unrevealing; his absolute lymphocyte count was 980. BERTIN virus was negative several months ago. MRIs  of his cervical spine from 2 years ago revealed moderate lesion load and moderate degenerative joint disease and cord compression. MRIs of his brain revealed slightly increased T2 flair but no other abnormalities. Assessment:     The patient suffers from secondary chronic progressive multiple sclerosis --- his EDSS remains 6.0 ----with his spastic paraparesis. He continues well with an occasional pain. I find no changes on exam.  He will switch to Vumerity. .    Fortunately, there have been no additional falls. His past MRIs are unchanged. We will repeat the MRIs of his brain and cervical spine on his next visit. His lumbosacral radiculopathy and carpal tunnel syndrome have remained asymptomatic. Medically he remains stable. Plan:     He will switch to Vumerity.     He will receive repeat blood work on his next visit as well as MRIs    He will continue swimming on a regular basis. He will return in 6 months. He will call if any problems arise. I spent 40 minutes with the patient with over 50 % spent in counseling and disease management. All patient issues were addressed and all questions were answered.      Vernon Goodrich MD  1:57 PM  6/8/2021

## 2021-06-16 ENCOUNTER — TELEPHONE (OUTPATIENT)
Dept: NEUROLOGY | Age: 56
End: 2021-06-16

## 2021-06-16 NOTE — TELEPHONE ENCOUNTER
Uri Munroe is approved from 5/17 to 6/16/22. Approval #[id number]. LM for pt that his medication is approved .  Faxed approval to [facility]

## 2021-07-06 RX ORDER — DIROXIMEL FUMARATE 231 MG/1
CAPSULE ORAL
Qty: 106 CAPSULE | Refills: 0 | Status: SHIPPED
Start: 2021-07-06 | End: 2022-05-18

## 2021-08-30 ENCOUNTER — TELEPHONE (OUTPATIENT)
Dept: NEUROLOGY | Age: 56
End: 2021-08-30

## 2021-08-30 NOTE — TELEPHONE ENCOUNTER
Patient called stating he will need a new handicap placard.     Electronically signed by Elham Archuleta on 8/30/21 at 9:57 AM EDT

## 2021-10-04 LAB
ORGANISM: ABNORMAL
ORGANISM: ABNORMAL
URINE CULTURE, ROUTINE: ABNORMAL

## 2021-10-25 LAB — URINE CULTURE, ROUTINE: NORMAL

## 2021-11-16 ENCOUNTER — OFFICE VISIT (OUTPATIENT)
Dept: NEUROLOGY | Age: 56
End: 2021-11-16
Payer: COMMERCIAL

## 2021-11-16 VITALS
WEIGHT: 195 LBS | BODY MASS INDEX: 30.09 KG/M2 | RESPIRATION RATE: 18 BRPM | OXYGEN SATURATION: 99 % | TEMPERATURE: 97.1 F | SYSTOLIC BLOOD PRESSURE: 151 MMHG | HEART RATE: 90 BPM | DIASTOLIC BLOOD PRESSURE: 89 MMHG

## 2021-11-16 DIAGNOSIS — G35 MULTIPLE SCLEROSIS (HCC): Primary | ICD-10-CM

## 2021-11-16 PROCEDURE — 99215 OFFICE O/P EST HI 40 MIN: CPT | Performed by: PSYCHIATRY & NEUROLOGY

## 2021-11-16 PROCEDURE — G8417 CALC BMI ABV UP PARAM F/U: HCPCS | Performed by: PSYCHIATRY & NEUROLOGY

## 2021-11-16 PROCEDURE — G8484 FLU IMMUNIZE NO ADMIN: HCPCS | Performed by: PSYCHIATRY & NEUROLOGY

## 2021-11-16 PROCEDURE — 1036F TOBACCO NON-USER: CPT | Performed by: PSYCHIATRY & NEUROLOGY

## 2021-11-16 PROCEDURE — G8427 DOCREV CUR MEDS BY ELIG CLIN: HCPCS | Performed by: PSYCHIATRY & NEUROLOGY

## 2021-11-16 PROCEDURE — 3017F COLORECTAL CA SCREEN DOC REV: CPT | Performed by: PSYCHIATRY & NEUROLOGY

## 2021-11-16 RX ORDER — FESOTERODINE FUMARATE 4 MG/1
TABLET, FILM COATED, EXTENDED RELEASE ORAL DAILY
COMMUNITY
Start: 2021-10-29

## 2021-11-16 RX ORDER — DIMETHYL FUMARATE 240 MG/1
240 CAPSULE ORAL 2 TIMES DAILY
Qty: 60 CAPSULE | Refills: 11 | Status: SHIPPED
Start: 2021-11-16 | End: 2021-12-21 | Stop reason: SDUPTHER

## 2021-11-16 NOTE — PROGRESS NOTES
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 He remained an excellent historian. His medications were to Vyvanse, Vumerity, multivitamins, ibuprofen and nasal sprays. He continued moderately well on Vumerity. However, he wished to return to brand-name Tecfidera. .  He still displayed spastic paraparesis, not questioning additional weakness in both legs following recent hospitalization. During that stay, a flaccid bladder was found. He was now self catheterizing, feeling much better. Hopefully, his bladder will return to normal size with recurrent catheterizations. His legs felt weaker since discharge and after his COVID-19 booster shot. He denied cognitive decline, speech or swallowing difficulties, headaches, other pains, incontinence or loss of consciousness. He used a cane on all occasions to prevent falling. Medically he reported no issues. He continued eating and sleeping well. He remained in good spirits, despite the COVID-19 lockdown. He received his COVID-19 vaccinations and recent booster. He remained active outside the home. His fatigue had not changed. Review of systems was otherwise unremarkable. Objective:     BP (!) 151/89   Pulse 90   Temp 97.1 °F (36.2 °C) (Infrared)   Resp 18   Wt 195 lb (88.5 kg)   SpO2 99%   BMI 30.09 kg/m²      He was a middle-age man in no acute distress, who was alert, cooperative and oriented. He was always pleasant. His skin was unremarkable. Head examination was unremarkable. His neck was supple without thyromegaly, with +2 carotid upstrokes. His spine displayed no abnormalities. His lungs were clear to auscultation. Cardiac testing proved unrevealing. Peripheral pulses were +1. His limbs displayed atrophy of both legs, but no changes.     Mental Status: Alert, oriented, thought content appropriate---with intact memories; there was no dysarthria or aphasia    Cranial Nerves:  I: smell    II: visual acuity     II: visual fields Full to confrontation   II: pupils MARIZOL   III,VII: ptosis None   III,IV,VI: extraocular muscles  Full ROM   V: mastication Normal   V: facial light touch sensation  Normal   V,VII: corneal reflex  Present   VII: facial muscle function - upper  Normal   VII: facial muscle function - lower Normal   VIII: hearing Normal   IX: soft palate elevation  Normal   IX,X: gag reflex Present   XI: trapezius strength  5/5   XI: sternocleidomastoid strength 5/5   XI: neck extension strength  5/5   XII: tongue strength  Normal     His left optic disc pallor remained, without red desaturation or Daniel pupil. Motor: There continued 5/5 strength throughout, with moderate spasticity in both legs    Sensory:  Light touch and vibration continued normal    Coordination:   I found intact finger to nose, heel to shin and rapid alternating movements    Gait:  He still displayed a moderate spastic paraparetic gait    DTR:   Reflexes remained +2 at both quadriceps but absent otherwise    His neurological examination was unchanged    Laboratory/Radiology:     Recent CMP was unrevealing; his absolute lymphocyte count was 980. BERTIN virus was negative several months ago. MRIs  of his cervical spine from over 2 years ago revealed moderate lesion load and moderate degenerative joint disease and cord compression. MRIs of his brain revealed slightly increased T2 flair but no other abnormalities. Assessment:     The patient suffers from secondary chronic progressive multiple sclerosis; his EDSS remains 6.0,-with his spastic paraparesis. He continues well with occasional pains. I find no changes on examination despite his subjective complaints. He will return to brand-name [facility]. His past MRIs are unchanged. If he does not improve soon, I will repeat his MRIs.     His lumbosacral radiculopathy and carpal tunnel syndrome remain asymptomatic. Medically he remains stable. Plan:     He will switch to take a day or 240 mg twice daily. He will report back in 1 month with his progress. Repeat blood work was scheduled. He will return to his regular exercise program.    He will return in 6 months. He will call at any time if any problems arise. I spent 40 minutes with the patient with over 50 % spent in counseling and disease management. All patient issues were addressed and all questions were answered.      Sugey Mendez MD  9:35 AM  11/16/2021

## 2021-11-19 ENCOUNTER — TELEPHONE (OUTPATIENT)
Dept: NEUROLOGY | Age: 56
End: 2021-11-19

## 2021-12-21 RX ORDER — DIMETHYL FUMARATE 240 MG/1
240 CAPSULE ORAL 2 TIMES DAILY
Qty: 60 CAPSULE | Refills: 11 | Status: SHIPPED | OUTPATIENT
Start: 2021-12-21 | End: 2022-12-21

## 2022-05-18 ENCOUNTER — OFFICE VISIT (OUTPATIENT)
Dept: NEUROLOGY | Age: 57
End: 2022-05-18
Payer: COMMERCIAL

## 2022-05-18 VITALS
HEIGHT: 67 IN | OXYGEN SATURATION: 98 % | BODY MASS INDEX: 32.02 KG/M2 | HEART RATE: 71 BPM | DIASTOLIC BLOOD PRESSURE: 82 MMHG | SYSTOLIC BLOOD PRESSURE: 135 MMHG | RESPIRATION RATE: 18 BRPM | TEMPERATURE: 97.3 F | WEIGHT: 204 LBS

## 2022-05-18 DIAGNOSIS — G35 MULTIPLE SCLEROSIS (HCC): Primary | ICD-10-CM

## 2022-05-18 PROCEDURE — G8417 CALC BMI ABV UP PARAM F/U: HCPCS | Performed by: PSYCHIATRY & NEUROLOGY

## 2022-05-18 PROCEDURE — 1036F TOBACCO NON-USER: CPT | Performed by: PSYCHIATRY & NEUROLOGY

## 2022-05-18 PROCEDURE — 99215 OFFICE O/P EST HI 40 MIN: CPT | Performed by: PSYCHIATRY & NEUROLOGY

## 2022-05-18 PROCEDURE — G8427 DOCREV CUR MEDS BY ELIG CLIN: HCPCS | Performed by: PSYCHIATRY & NEUROLOGY

## 2022-05-18 PROCEDURE — 3017F COLORECTAL CA SCREEN DOC REV: CPT | Performed by: PSYCHIATRY & NEUROLOGY

## 2022-05-18 NOTE — PROGRESS NOTES
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 He remained an excellent historian. His medications were now dimethyl fumarate, to advise, multivitamins, ibuprofen and nasal sprays. He continued moderately since switching back to dimethyl fumarate. He still displayed spastic paraparesis, without additional weakness in both legs. During the past hospitalization, a flaccid bladder was found. His bladder function was returning to normal.    His legs were no weaker. He denied cognitive decline, speech or swallowing difficulties, headaches, other pains, incontinence or loss of consciousness. He used a cane on all occasions to prevent falling. There were no medical issues. He continued eating and sleeping well. He remained in good spirits. He received his COVID-19 vaccinations and booster. He remained active outside the home. His fatigue had not changed. Review of systems was otherwise unremarkable. Objective:     /82 (Site: Left Upper Arm, Position: Sitting)   Pulse 71   Temp 97.3 °F (36.3 °C)   Resp 18   Ht 5' 7\" (1.702 m)   Wt 204 lb (92.5 kg)   SpO2 98%   BMI 31.95 kg/m²      He was a middle-age man in no acute distress, who was alert, cooperative and oriented. He was always pleasant. His skin was unremarkable. Head examination was unremarkable. His neck was supple without thyromegaly, with +2 carotid upstrokes. His spine displayed no abnormalities. His lungs were clear to auscultation. Cardiac testing proved unrevealing. Peripheral pulses were +1. His limbs displayed atrophy of both legs, but no changes.     Mental Status: Alert, oriented, thought content appropriate, with intact memories; there was no dysarthria or aphasia    Cranial Nerves:  I: smell    II: visual acuity     II: visual fields Full to confrontation   II: pupils MARIZOL   III,VII: ptosis None   III,IV,VI: extraocular muscles  Full ROM   V: mastication Normal   V: facial light touch sensation  Normal   V,VII: corneal reflex  Present   VII: facial muscle function - upper  Normal   VII: facial muscle function - lower Normal   VIII: hearing Normal   IX: soft palate elevation  Normal   IX,X: gag reflex Present   XI: trapezius strength  5/5   XI: sternocleidomastoid strength 5/5   XI: neck extension strength  5/5   XII: tongue strength  Normal     His left optic disc pallor remained, without red desaturation or a Daniel pupil. Motor: There continued 5/5 strength throughout, with moderate spasticity in both legs    Sensory:  Light touch and vibration continued normal    Coordination:   I found intact finger to nose, heel to shin and rapid alternating movements    Gait:  He still displayed a moderate spastic paraparetic gait    DTR:   Reflexes remained +2 at both quadriceps but absent otherwise    His neurological examination was unchanged    Laboratory/Radiology:     Past CMP was unrevealing; his absolute lymphocyte count was 980. BERTIN virus was negative. MRIs  of his cervical spine from o 3 years ago revealed moderate lesion load and moderate degenerative joint disease and cord compression. MRIs of his brain revealed slightly increased T2 flair but no other abnormalities. Assessment:     The patient suffers from secondary chronic progressive multiple sclerosis; his EDSS remains 6.0, with the spastic paraparesis. He continues well with occasional pains. I find no changes on examination. He will continue dimethyl fumarate. His lumbosacral radiculopathy and carpal tunnel syndrome remain asymptomatic. Medically he remains stable. Plan:     He will continue with dimethyl fumarate. Repeat blood work was scheduled, as well as MRIs. He will return to his regular exercise program.    He will return in 6 months, with me. He will call at any time if any problems arise.     I spent 40 minutes with the patient with over 50 % spent in counseling and disease management. All patient issues were addressed and all questions were answered.      Ruy Borges MD  1:28 PM  5/18/2022

## 2022-11-09 ENCOUNTER — TELEPHONE (OUTPATIENT)
Dept: NEUROLOGY | Age: 57
End: 2022-11-09

## 2022-11-15 RX ORDER — DIMETHYL FUMARATE 240 MG/1
240 CAPSULE ORAL 2 TIMES DAILY
Qty: 60 CAPSULE | Refills: 11 | Status: ACTIVE | OUTPATIENT
Start: 2022-11-15 | End: 2023-11-15

## 2022-12-06 ENCOUNTER — OFFICE VISIT (OUTPATIENT)
Dept: NEUROLOGY | Age: 57
End: 2022-12-06
Payer: COMMERCIAL

## 2022-12-06 ENCOUNTER — HOSPITAL ENCOUNTER (OUTPATIENT)
Age: 57
Discharge: HOME | End: 2022-12-06
Payer: COMMERCIAL

## 2022-12-06 VITALS
BODY MASS INDEX: 32.78 KG/M2 | OXYGEN SATURATION: 98 % | HEIGHT: 66 IN | RESPIRATION RATE: 18 BRPM | HEART RATE: 112 BPM | SYSTOLIC BLOOD PRESSURE: 126 MMHG | DIASTOLIC BLOOD PRESSURE: 84 MMHG | WEIGHT: 204 LBS

## 2022-12-06 DIAGNOSIS — G35 MULTIPLE SCLEROSIS (HCC): Primary | ICD-10-CM

## 2022-12-06 DIAGNOSIS — G35 MULTIPLE SCLEROSIS (HCC): ICD-10-CM

## 2022-12-06 LAB
ALBUMIN SERPL-MCNC: 4.5 G/DL (ref 3.5–5.2)
ALP BLD-CCNC: 74 U/L (ref 40–129)
ALT SERPL-CCNC: 32 U/L (ref 0–40)
ANION GAP SERPL CALCULATED.3IONS-SCNC: 13 MMOL/L (ref 7–16)
AST SERPL-CCNC: 54 U/L (ref 0–39)
BASOPHILS ABSOLUTE: 0.03 E9/L (ref 0–0.2)
BASOPHILS RELATIVE PERCENT: 0.7 % (ref 0–2)
BILIRUB SERPL-MCNC: 0.8 MG/DL (ref 0–1.2)
BUN BLDV-MCNC: 11 MG/DL (ref 6–20)
CALCIUM SERPL-MCNC: 10 MG/DL (ref 8.6–10.2)
CHLORIDE BLD-SCNC: 99 MMOL/L (ref 98–107)
CO2: 28 MMOL/L (ref 22–29)
CREAT SERPL-MCNC: 1.1 MG/DL (ref 0.7–1.2)
EOSINOPHILS ABSOLUTE: 0.08 E9/L (ref 0.05–0.5)
EOSINOPHILS RELATIVE PERCENT: 1.9 % (ref 0–6)
GFR SERPL CREATININE-BSD FRML MDRD: >60 ML/MIN/1.73
GLUCOSE BLD-MCNC: 87 MG/DL (ref 74–99)
HCT VFR BLD CALC: 44.2 % (ref 37–54)
HEMOGLOBIN: 15.4 G/DL (ref 12.5–16.5)
IMMATURE GRANULOCYTES #: 0.03 E9/L
IMMATURE GRANULOCYTES %: 0.7 % (ref 0–5)
LYMPHOCYTES ABSOLUTE: 0.61 E9/L (ref 1.5–4)
LYMPHOCYTES RELATIVE PERCENT: 14.7 % (ref 20–42)
MCH RBC QN AUTO: 33.9 PG (ref 26–35)
MCHC RBC AUTO-ENTMCNC: 34.8 % (ref 32–34.5)
MCV RBC AUTO: 97.4 FL (ref 80–99.9)
MONOCYTES ABSOLUTE: 0.6 E9/L (ref 0.1–0.95)
MONOCYTES RELATIVE PERCENT: 14.5 % (ref 2–12)
NEUTROPHILS ABSOLUTE: 2.79 E9/L (ref 1.8–7.3)
NEUTROPHILS RELATIVE PERCENT: 67.5 % (ref 43–80)
PDW BLD-RTO: 12.9 FL (ref 11.5–15)
PLATELET # BLD: 168 E9/L (ref 130–450)
PMV BLD AUTO: 9.8 FL (ref 7–12)
POTASSIUM SERPL-SCNC: 4.3 MMOL/L (ref 3.5–5)
RBC # BLD: 4.54 E12/L (ref 3.8–5.8)
SODIUM BLD-SCNC: 140 MMOL/L (ref 132–146)
TOTAL PROTEIN: 7.9 G/DL (ref 6.4–8.3)
WBC # BLD: 4.1 E9/L (ref 4.5–11.5)

## 2022-12-06 PROCEDURE — G8427 DOCREV CUR MEDS BY ELIG CLIN: HCPCS | Performed by: PSYCHIATRY & NEUROLOGY

## 2022-12-06 PROCEDURE — 87798 DETECT AGENT NOS DNA AMP: CPT

## 2022-12-06 PROCEDURE — 1036F TOBACCO NON-USER: CPT | Performed by: PSYCHIATRY & NEUROLOGY

## 2022-12-06 PROCEDURE — 85025 COMPLETE CBC W/AUTO DIFF WBC: CPT

## 2022-12-06 PROCEDURE — 80053 COMPREHEN METABOLIC PANEL: CPT

## 2022-12-06 PROCEDURE — 36415 COLL VENOUS BLD VENIPUNCTURE: CPT

## 2022-12-06 PROCEDURE — 3017F COLORECTAL CA SCREEN DOC REV: CPT | Performed by: PSYCHIATRY & NEUROLOGY

## 2022-12-06 PROCEDURE — G8484 FLU IMMUNIZE NO ADMIN: HCPCS | Performed by: PSYCHIATRY & NEUROLOGY

## 2022-12-06 PROCEDURE — 99215 OFFICE O/P EST HI 40 MIN: CPT | Performed by: PSYCHIATRY & NEUROLOGY

## 2022-12-06 PROCEDURE — G8417 CALC BMI ABV UP PARAM F/U: HCPCS | Performed by: PSYCHIATRY & NEUROLOGY

## 2022-12-06 NOTE — PROGRESS NOTES
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 He remained an excellent historian. His medications continued as dimethyl fumarate, fesoterodine, multivitamins, ibuprofen and nasal sprays. He remained on brand-name dimethyl fumarate. He still displayed spastic paraparesis, without additional weakness in both legs. During the past hospitalization, a flaccid bladder was also found. His bladder function had returned to normal.    His legs were no weaker. He again denied cognitive decline, speech or swallowing difficulties, headaches, other pains, incontinence or loss of consciousness. He used a cane on all occasions to prevent falling. There were no medical issues. He continued eating and sleeping well. He remained in good spirits. He received his COVID-19 vaccinations and boosters    He remained active outside the home. His fatigue had not worsened. Review of systems was otherwise unremarkable. Objective:     /84 (Site: Left Upper Arm, Position: Sitting, Cuff Size: Medium Adult)   Pulse (!) 112   Resp 18   Ht 5' 6\" (1.676 m)   Wt 204 lb (92.5 kg)   SpO2 98%   BMI 32.93 kg/m²      He was a middle-age man in no acute distress, who was alert, cooperative and oriented. He was always pleasant. His skin was unremarkable. Head examination was unremarkable. His neck was supple without thyromegaly, with +2 carotid upstrokes. His spine displayed no abnormalities. His lungs were clear to auscultation. Cardiac testing proved unrevealing. Peripheral pulses were +1. His limbs displayed atrophy of both legs, but no changes. He was alert, oriented, thought content appropriate, with intact memories; there was no dysarthria or aphasia.     Cranial Nerves:  I: smell    II: visual acuity     II: visual fields Full to confrontation   II: pupils MARIZOL   III,VII: ptosis None   III,IV,VI: extraocular muscles  Full ROM   V: mastication Normal   V: facial light touch sensation  Normal   V,VII: corneal reflex  Present   VII: facial muscle function - upper  Normal   VII: facial muscle function - lower Normal   VIII: hearing Normal   IX: soft palate elevation  Normal   IX,X: gag reflex Present   XI: trapezius strength  5/5   XI: sternocleidomastoid strength 5/5   XI: neck extension strength  5/5   XII: tongue strength  Normal     His left optic disc pallor remained, without red desaturation or a Daniel pupil. Motor: There continued 5/5 strength throughout, with moderate spasticity in both legs    Sensory:  Light touch and vibration remained normal    Coordination:   I found intact finger to nose, heel to shin and rapid alternating movements    Gait:  He still displayed a moderate spastic paraparetic gait    DTR:   Reflexes remained +2 at both quadriceps but absent otherwise    His neurological examination was unchanged    Laboratory/Radiology:     Past CMP was unrevealing; his absolute lymphocyte count was 980. BERTIN virus was negative. MRIs  of his cervical spine from 3 years ago revealed moderate lesion load and moderate degenerative joint disease and cord compression. Past MRIs of his brain revealed slightly increased T2 flair but no other abnormalities. Assessment:     The patient suffers from secondary chronic progressive multiple sclerosis; his EDSS remains 6.0, with only spastic paraparesis. He continues well with occasional pains. I find no changes on examination. He will continue brand-name dimethyl fumarate. However, routine studies are in order. His lumbosacral radiculopathy and carpal tunnel syndrome remain asymptomatic. Medically he remains stable. Plan:     He will continue with brand-name dimethyl fumarate.     Repeat routine and specialty blood work was ordered    He will continue his regular exercise program.    He will return in 4 months, to see Dr. Suzie Doran    He will call at any time if any problems arise. I spent 40 minutes with the patient with over 50 % spent in counseling and disease management. All patient issues were addressed and all questions were answered.      Edie James MD  11:22 AM  12/6/2022

## 2022-12-11 LAB
Lab: NORMAL
REPORT: NORMAL
THIS TEST SENT TO: NORMAL

## 2023-05-22 ENCOUNTER — OFFICE VISIT (OUTPATIENT)
Dept: NEUROLOGY | Age: 58
End: 2023-05-22
Payer: COMMERCIAL

## 2023-05-22 VITALS
TEMPERATURE: 99 F | OXYGEN SATURATION: 98 % | HEIGHT: 67 IN | SYSTOLIC BLOOD PRESSURE: 135 MMHG | RESPIRATION RATE: 18 BRPM | WEIGHT: 190 LBS | DIASTOLIC BLOOD PRESSURE: 82 MMHG | BODY MASS INDEX: 29.82 KG/M2 | HEART RATE: 108 BPM

## 2023-05-22 DIAGNOSIS — G35 MULTIPLE SCLEROSIS (HCC): Primary | ICD-10-CM

## 2023-05-22 PROCEDURE — G8427 DOCREV CUR MEDS BY ELIG CLIN: HCPCS | Performed by: CLINICAL NURSE SPECIALIST

## 2023-05-22 PROCEDURE — 3017F COLORECTAL CA SCREEN DOC REV: CPT | Performed by: CLINICAL NURSE SPECIALIST

## 2023-05-22 PROCEDURE — 99215 OFFICE O/P EST HI 40 MIN: CPT | Performed by: CLINICAL NURSE SPECIALIST

## 2023-05-22 PROCEDURE — 1036F TOBACCO NON-USER: CPT | Performed by: CLINICAL NURSE SPECIALIST

## 2023-05-22 PROCEDURE — G8417 CALC BMI ABV UP PARAM F/U: HCPCS | Performed by: CLINICAL NURSE SPECIALIST

## 2023-05-23 ENCOUNTER — HOSPITAL ENCOUNTER (OUTPATIENT)
Age: 58
Discharge: HOME | End: 2023-05-23
Payer: COMMERCIAL

## 2023-05-23 DIAGNOSIS — G35 MULTIPLE SCLEROSIS (HCC): ICD-10-CM

## 2023-05-23 LAB
BASOPHILS # BLD: 0.05 E9/L (ref 0–0.2)
BASOPHILS NFR BLD: 0.9 % (ref 0–2)
EOSINOPHIL # BLD: 0.1 E9/L (ref 0.05–0.5)
EOSINOPHIL NFR BLD: 1.9 % (ref 0–6)
ERYTHROCYTE [DISTWIDTH] IN BLOOD BY AUTOMATED COUNT: 12.9 FL (ref 11.5–15)
HCT VFR BLD AUTO: 42.3 % (ref 37–54)
HGB BLD-MCNC: 14.6 G/DL (ref 12.5–16.5)
IMM GRANULOCYTES # BLD: 0.01 E9/L
IMM GRANULOCYTES NFR BLD: 0.2 % (ref 0–5)
LYMPHOCYTES # BLD: 0.61 E9/L (ref 1.5–4)
LYMPHOCYTES NFR BLD: 11.4 % (ref 20–42)
MCH RBC QN AUTO: 35.3 PG (ref 26–35)
MCHC RBC AUTO-ENTMCNC: 34.5 % (ref 32–34.5)
MCV RBC AUTO: 102.2 FL (ref 80–99.9)
MONOCYTES # BLD: 0.71 E9/L (ref 0.1–0.95)
MONOCYTES NFR BLD: 13.3 % (ref 2–12)
NEUTROPHILS # BLD: 3.86 E9/L (ref 1.8–7.3)
NEUTS SEG NFR BLD: 72.3 % (ref 43–80)
PLATELET # BLD AUTO: 164 E9/L (ref 130–450)
PMV BLD AUTO: 9.8 FL (ref 7–12)
RBC # BLD AUTO: 4.14 E12/L (ref 3.8–5.8)
WBC # BLD: 5.3 E9/L (ref 4.5–11.5)

## 2023-05-23 PROCEDURE — 85025 COMPLETE CBC W/AUTO DIFF WBC: CPT

## 2023-05-23 PROCEDURE — 36415 COLL VENOUS BLD VENIPUNCTURE: CPT

## 2023-08-11 ENCOUNTER — TELEPHONE (OUTPATIENT)
Dept: NEUROLOGY | Age: 58
End: 2023-08-11

## 2023-08-11 NOTE — TELEPHONE ENCOUNTER
Pt called back and expressed concern that he has not been contacted. He would like to speak with someone today regarding his increased pain. Staff unavailable due to pt care. Please contact pt.

## 2023-08-11 NOTE — TELEPHONE ENCOUNTER
Vernon Vigil is requesting a sooner appointment with Dee Strong due to increased pain in his back and right shoulder. He thinks it's an MS flare up. He is requesting a call back.

## 2023-08-11 NOTE — TELEPHONE ENCOUNTER
Patient called in stating that he needs to set up an appointment to see [facility]. He is having pain and tingling in his back and going in to his right hand. It's gotten worse over the last week. The pain is about an 8. He is insistent that he is seen ASAP. Scheduled him for Monday.

## 2023-08-14 ENCOUNTER — OFFICE VISIT (OUTPATIENT)
Dept: NEUROLOGY | Age: 58
End: 2023-08-14
Payer: COMMERCIAL

## 2023-08-14 VITALS
SYSTOLIC BLOOD PRESSURE: 162 MMHG | WEIGHT: 199 LBS | BODY MASS INDEX: 31.17 KG/M2 | OXYGEN SATURATION: 96 % | TEMPERATURE: 98.2 F | HEART RATE: 84 BPM | DIASTOLIC BLOOD PRESSURE: 82 MMHG

## 2023-08-14 DIAGNOSIS — G35 MULTIPLE SCLEROSIS (HCC): Primary | ICD-10-CM

## 2023-08-14 DIAGNOSIS — M54.12 CERVICAL RADICULOPATHY: ICD-10-CM

## 2023-08-14 PROCEDURE — G8417 CALC BMI ABV UP PARAM F/U: HCPCS | Performed by: CLINICAL NURSE SPECIALIST

## 2023-08-14 PROCEDURE — 3017F COLORECTAL CA SCREEN DOC REV: CPT | Performed by: CLINICAL NURSE SPECIALIST

## 2023-08-14 PROCEDURE — G8427 DOCREV CUR MEDS BY ELIG CLIN: HCPCS | Performed by: CLINICAL NURSE SPECIALIST

## 2023-08-14 PROCEDURE — 1036F TOBACCO NON-USER: CPT | Performed by: CLINICAL NURSE SPECIALIST

## 2023-08-14 PROCEDURE — 99215 OFFICE O/P EST HI 40 MIN: CPT | Performed by: CLINICAL NURSE SPECIALIST

## 2023-08-14 RX ORDER — GABAPENTIN 300 MG/1
300 CAPSULE ORAL 3 TIMES DAILY
Qty: 90 CAPSULE | Refills: 2 | Status: SHIPPED | OUTPATIENT
Start: 2023-08-14 | End: 2023-11-12

## 2023-09-06 LAB
ALBUMIN SERPL-MCNC: 4.5 G/DL
ALP BLD-CCNC: 62 U/L
ALT SERPL-CCNC: 42 U/L
ANION GAP SERPL CALCULATED.3IONS-SCNC: NORMAL MMOL/L
AST SERPL-CCNC: 67 U/L
BASOPHILS ABSOLUTE: 37 /ΜL
BASOPHILS RELATIVE PERCENT: 0.5 %
BILIRUB SERPL-MCNC: 0.9 MG/DL (ref 0.1–1.4)
BUN BLDV-MCNC: 11 MG/DL
CALCIUM SERPL-MCNC: 9.4 MG/DL
CHLORIDE BLD-SCNC: 103 MMOL/L
CO2: 25 MMOL/L
CREAT SERPL-MCNC: 0.8 MG/DL
EGFR: 103
EOSINOPHILS ABSOLUTE: 110 /ΜL
EOSINOPHILS RELATIVE PERCENT: 1.5 %
GLUCOSE BLD-MCNC: 87 MG/DL
HCT VFR BLD CALC: 43 % (ref 41–53)
HEMOGLOBIN: 14.7 G/DL (ref 13.5–17.5)
LYMPHOCYTES ABSOLUTE: 621 /ΜL
LYMPHOCYTES RELATIVE PERCENT: 8.5 %
MCH RBC QN AUTO: 34.9 PG
MCHC RBC AUTO-ENTMCNC: 34.2 G/DL
MCV RBC AUTO: 102.1 FL
MONOCYTES ABSOLUTE: 650 /ΜL
MONOCYTES RELATIVE PERCENT: 8.9 %
NEUTROPHILS ABSOLUTE: 5884 /ΜL
NEUTROPHILS RELATIVE PERCENT: 80.6 %
PDW BLD-RTO: 13.6 %
PLATELET # BLD: 178 K/ΜL
PMV BLD AUTO: 10.6 FL
POTASSIUM SERPL-SCNC: 4.6 MMOL/L
RBC # BLD: 4.21 10^6/ΜL
SODIUM BLD-SCNC: 141 MMOL/L
TOTAL PROTEIN: 7.7
WBC # BLD: 7.3 10^3/ML

## 2023-09-15 DIAGNOSIS — G35 MULTIPLE SCLEROSIS (HCC): ICD-10-CM

## 2023-09-16 DIAGNOSIS — G35 MULTIPLE SCLEROSIS (HCC): ICD-10-CM

## 2023-09-28 ENCOUNTER — TELEPHONE (OUTPATIENT)
Dept: NEUROLOGY | Age: 58
End: 2023-09-28

## 2023-09-28 DIAGNOSIS — M48.02 CERVICAL STENOSIS OF SPINE: ICD-10-CM

## 2023-09-28 DIAGNOSIS — G35 MULTIPLE SCLEROSIS (HCC): Primary | ICD-10-CM

## 2023-09-28 NOTE — TELEPHONE ENCOUNTER
Patients caregiver J Luis Wynn was notified of MRI results. He wants Sanya to be referred to a spine specialist please. He will be present at his future appointment on 11/9/23.

## 2023-10-03 RX ORDER — DIMETHYL FUMARATE 240 MG/1
240 CAPSULE ORAL 2 TIMES DAILY
Qty: 60 CAPSULE | Refills: 11 | Status: ACTIVE | OUTPATIENT
Start: 2023-10-03

## 2023-10-16 ENCOUNTER — HOSPITAL ENCOUNTER (OUTPATIENT)
Age: 58
Discharge: HOME | End: 2023-10-18
Payer: COMMERCIAL

## 2023-10-16 ENCOUNTER — INITIAL CONSULT (OUTPATIENT)
Dept: NEUROSURGERY | Age: 58
End: 2023-10-16
Payer: COMMERCIAL

## 2023-10-16 ENCOUNTER — HOSPITAL ENCOUNTER (OUTPATIENT)
Dept: GENERAL RADIOLOGY | Age: 58
Discharge: HOME | End: 2023-10-18
Payer: COMMERCIAL

## 2023-10-16 VITALS
HEART RATE: 86 BPM | BODY MASS INDEX: 29.82 KG/M2 | HEIGHT: 67 IN | WEIGHT: 190 LBS | OXYGEN SATURATION: 96 % | TEMPERATURE: 97.7 F | SYSTOLIC BLOOD PRESSURE: 136 MMHG | DIASTOLIC BLOOD PRESSURE: 84 MMHG

## 2023-10-16 DIAGNOSIS — M25.511 ACUTE PAIN OF RIGHT SHOULDER: Primary | ICD-10-CM

## 2023-10-16 DIAGNOSIS — M25.511 ACUTE PAIN OF RIGHT SHOULDER: ICD-10-CM

## 2023-10-16 PROCEDURE — G8484 FLU IMMUNIZE NO ADMIN: HCPCS | Performed by: PHYSICIAN ASSISTANT

## 2023-10-16 PROCEDURE — G8427 DOCREV CUR MEDS BY ELIG CLIN: HCPCS | Performed by: PHYSICIAN ASSISTANT

## 2023-10-16 PROCEDURE — 99203 OFFICE O/P NEW LOW 30 MIN: CPT | Performed by: PHYSICIAN ASSISTANT

## 2023-10-16 PROCEDURE — 1036F TOBACCO NON-USER: CPT | Performed by: PHYSICIAN ASSISTANT

## 2023-10-16 PROCEDURE — 3017F COLORECTAL CA SCREEN DOC REV: CPT | Performed by: PHYSICIAN ASSISTANT

## 2023-10-16 PROCEDURE — 73030 X-RAY EXAM OF SHOULDER: CPT

## 2023-10-16 PROCEDURE — G8417 CALC BMI ABV UP PARAM F/U: HCPCS | Performed by: PHYSICIAN ASSISTANT

## 2023-10-16 PROCEDURE — 99202 OFFICE O/P NEW SF 15 MIN: CPT

## 2023-10-16 ASSESSMENT — ENCOUNTER SYMPTOMS
ALLERGIC/IMMUNOLOGIC NEGATIVE: 1
EYES NEGATIVE: 1
GASTROINTESTINAL NEGATIVE: 1
RESPIRATORY NEGATIVE: 1

## 2023-10-16 NOTE — PROGRESS NOTES
Subjective:      Patient ID: Corinne Liter is a 62 y.o. male. Neck Pain   This is a chronic problem. The current episode started more than 1 year ago. The problem occurs constantly. The problem has been gradually worsening. The pain is present in the midline (and right arm pain). The pain is at a severity of 8/10. The symptoms are aggravated by twisting, stress and bending. Associated symptoms comments: +history of MS. Self cath's 4x day for past 18months. . He has tried acetaminophen (gabapentin) for the symptoms. The treatment provided mild relief. Review of Systems   Constitutional: Negative. HENT: Negative. Eyes: Negative. Respiratory: Negative. Cardiovascular: Negative. Gastrointestinal: Negative. Endocrine: Negative. Genitourinary: Negative. Musculoskeletal:  Positive for neck pain. Skin: Negative. Allergic/Immunologic: Negative. Neurological: Negative. Hematological: Negative. Psychiatric/Behavioral: Negative. Objective:   Physical Exam  Constitutional:       Appearance: Normal appearance. HENT:      Head: Normocephalic and atraumatic. Nose: Nose normal.   Eyes:      Pupils: Pupils are equal, round, and reactive to light. Pulmonary:      Effort: Pulmonary effort is normal.   Abdominal:      General: There is no distension. Skin:     General: Skin is warm and dry. Neurological:      Mental Status: He is alert. GCS: GCS eye subscore is 4. GCS verbal subscore is 5. GCS motor subscore is 6. Cranial Nerves: Cranial nerves 2-12 are intact. Sensory: Sensation is intact. Motor: Motor function is intact. Gait: Gait abnormal.   Psychiatric:         Mood and Affect: Mood normal.     +pain with ROM and palpation of right shoulder    Assessment:      62year old male with neck and right shoulder pain for over a year that is severe. Cervical MRI reveals right sided C4-5, C5-6, and C6-7 disc herniations.   His exam is most

## 2023-11-10 ENCOUNTER — HOSPITAL ENCOUNTER (OUTPATIENT)
Dept: MRI IMAGING | Age: 58
End: 2023-11-10
Payer: COMMERCIAL

## 2023-11-10 DIAGNOSIS — M25.511 ACUTE PAIN OF RIGHT SHOULDER: ICD-10-CM

## 2023-11-10 PROCEDURE — 73221 MRI JOINT UPR EXTREM W/O DYE: CPT

## 2023-11-16 ENCOUNTER — TELEPHONE (OUTPATIENT)
Dept: NEUROLOGY | Age: 58
End: 2023-11-16

## 2024-05-13 ENCOUNTER — HOSPITAL ENCOUNTER (OUTPATIENT)
Age: 59
Discharge: HOME | End: 2024-05-13
Payer: COMMERCIAL

## 2024-05-13 LAB — PSA SERPL-MCNC: 4.45 NG/ML (ref 0–4)

## 2024-05-13 PROCEDURE — G0103 PSA SCREENING: HCPCS

## 2024-05-13 PROCEDURE — 36415 COLL VENOUS BLD VENIPUNCTURE: CPT

## 2024-09-10 ENCOUNTER — HOSPITAL ENCOUNTER (OUTPATIENT)
Age: 59
Discharge: HOME | End: 2024-09-10
Payer: COMMERCIAL

## 2024-09-10 DIAGNOSIS — R63.8 OTHER SYMPTOMS AND SIGNS CONCERNING FOOD AND FLUID INTAKE: ICD-10-CM

## 2024-09-10 DIAGNOSIS — R73.03 PREDIABETES: ICD-10-CM

## 2024-09-10 DIAGNOSIS — E55.9 VITAMIN D DEFICIENCY, UNSPECIFIED: ICD-10-CM

## 2024-09-10 DIAGNOSIS — E44.1 MILD PROTEIN-CALORIE MALNUTRITION (HCC): ICD-10-CM

## 2024-09-10 DIAGNOSIS — E78.2 MIXED HYPERLIPIDEMIA: ICD-10-CM

## 2024-09-10 DIAGNOSIS — G35 MULTIPLE SCLEROSIS (HCC): ICD-10-CM

## 2024-09-10 DIAGNOSIS — M62.9 DISORDER OF MUSCLE, UNSPECIFIED: ICD-10-CM

## 2024-09-10 LAB
ALBUMIN SERPL-MCNC: 4.5 G/DL (ref 3.5–5.2)
ALP SERPL-CCNC: 88 U/L (ref 40–129)
ALT SERPL-CCNC: 74 U/L (ref 0–40)
ANION GAP SERPL CALCULATED.3IONS-SCNC: 15 MMOL/L (ref 7–16)
AST SERPL-CCNC: 158 U/L (ref 0–39)
BASOPHILS # BLD: 0.05 K/UL (ref 0–0.2)
BASOPHILS NFR BLD: 1 % (ref 0–2)
BILIRUB SERPL-MCNC: 1.5 MG/DL (ref 0–1.2)
BUN SERPL-MCNC: 9 MG/DL (ref 6–20)
CALCIUM SERPL-MCNC: 9.7 MG/DL (ref 8.6–10.2)
CHLORIDE SERPL-SCNC: 96 MMOL/L (ref 98–107)
CK SERPL-CCNC: 43 U/L (ref 20–200)
CO2 SERPL-SCNC: 27 MMOL/L (ref 22–29)
CREAT SERPL-MCNC: 0.9 MG/DL (ref 0.7–1.2)
EOSINOPHIL # BLD: 0.09 K/UL (ref 0.05–0.5)
EOSINOPHILS RELATIVE PERCENT: 2 % (ref 0–6)
ERYTHROCYTE [DISTWIDTH] IN BLOOD BY AUTOMATED COUNT: 13.4 % (ref 11.5–15)
GFR, ESTIMATED: >90 ML/MIN/1.73M2
GLUCOSE SERPL-MCNC: 141 MG/DL (ref 74–99)
HBA1C MFR BLD: 4.5 % (ref 4–5.6)
HCT VFR BLD AUTO: 41.1 % (ref 37–54)
HGB BLD-MCNC: 13.9 G/DL (ref 12.5–16.5)
IMM GRANULOCYTES # BLD AUTO: 0.03 K/UL (ref 0–0.58)
IMM GRANULOCYTES NFR BLD: 1 % (ref 0–5)
LYMPHOCYTES NFR BLD: 0.58 K/UL (ref 1.5–4)
LYMPHOCYTES RELATIVE PERCENT: 14 % (ref 20–42)
MCH RBC QN AUTO: 34 PG (ref 26–35)
MCHC RBC AUTO-ENTMCNC: 33.8 G/DL (ref 32–34.5)
MCV RBC AUTO: 100.5 FL (ref 80–99.9)
MONOCYTES NFR BLD: 0.42 K/UL (ref 0.1–0.95)
MONOCYTES NFR BLD: 10 % (ref 2–12)
NEUTROPHILS NFR BLD: 73 % (ref 43–80)
NEUTS SEG NFR BLD: 3.12 K/UL (ref 1.8–7.3)
PLATELET # BLD AUTO: 167 K/UL (ref 130–450)
PMV BLD AUTO: 10.2 FL (ref 7–12)
POTASSIUM SERPL-SCNC: 3.9 MMOL/L (ref 3.5–5)
PROT SERPL-MCNC: 7.7 G/DL (ref 6.4–8.3)
RBC # BLD AUTO: 4.09 M/UL (ref 3.8–5.8)
SODIUM SERPL-SCNC: 138 MMOL/L (ref 132–146)
WBC OTHER # BLD: 4.3 K/UL (ref 4.5–11.5)

## 2024-09-10 PROCEDURE — 82550 ASSAY OF CK (CPK): CPT

## 2024-09-10 PROCEDURE — 84207 ASSAY OF VITAMIN B-6: CPT

## 2024-09-10 PROCEDURE — 82607 VITAMIN B-12: CPT

## 2024-09-10 PROCEDURE — 82306 VITAMIN D 25 HYDROXY: CPT

## 2024-09-10 PROCEDURE — 83090 ASSAY OF HOMOCYSTEINE: CPT

## 2024-09-10 PROCEDURE — 83540 ASSAY OF IRON: CPT

## 2024-09-10 PROCEDURE — 83036 HEMOGLOBIN GLYCOSYLATED A1C: CPT

## 2024-09-10 PROCEDURE — 86039 ANTINUCLEAR ANTIBODIES (ANA): CPT

## 2024-09-10 PROCEDURE — 82746 ASSAY OF FOLIC ACID SERUM: CPT

## 2024-09-10 PROCEDURE — 84443 ASSAY THYROID STIM HORMONE: CPT

## 2024-09-10 PROCEDURE — 80053 COMPREHEN METABOLIC PANEL: CPT

## 2024-09-10 PROCEDURE — 36415 COLL VENOUS BLD VENIPUNCTURE: CPT

## 2024-09-10 PROCEDURE — 84439 ASSAY OF FREE THYROXINE: CPT

## 2024-09-10 PROCEDURE — 83550 IRON BINDING TEST: CPT

## 2024-09-10 PROCEDURE — 85025 COMPLETE CBC W/AUTO DIFF WBC: CPT

## 2024-09-10 PROCEDURE — 84153 ASSAY OF PSA TOTAL: CPT

## 2024-09-10 PROCEDURE — 86038 ANTINUCLEAR ANTIBODIES: CPT

## 2024-09-10 PROCEDURE — 84425 ASSAY OF VITAMIN B-1: CPT

## 2024-09-11 LAB
25(OH)D3 SERPL-MCNC: 21.8 NG/ML (ref 30–100)
ANA SER QL IA: NEGATIVE
FOLATE SERPL-MCNC: 10.8 NG/ML (ref 4.8–24.2)
HCYS SERPL-SCNC: 16.3 UMOL/L (ref 0–15)
IRON SATN MFR SERPL: 42 % (ref 20–55)
IRON SERPL-MCNC: 135 UG/DL (ref 59–158)
PSA SERPL-MCNC: 2.91 NG/ML (ref 0–4)
T4 FREE SERPL-MCNC: 1.4 NG/DL (ref 0.9–1.7)
TIBC SERPL-MCNC: 324 UG/DL (ref 250–450)
TSH SERPL DL<=0.05 MIU/L-ACNC: 1.7 UIU/ML (ref 0.27–4.2)
VIT B12 SERPL-MCNC: 810 PG/ML (ref 211–946)

## 2024-09-14 LAB — PYRIDOXAL PHOS SERPL-SCNC: 28.5 NMOL/L (ref 20–125)

## 2024-09-15 LAB — VIT B1 PYROPHOSHATE BLD-SCNC: 139 NMOL/L (ref 70–180)

## 2025-06-12 ENCOUNTER — TELEPHONE (OUTPATIENT)
Age: 60
End: 2025-06-12

## 2025-06-12 RX ORDER — DIMETHYL FUMARATE 240 MG/1
240 CAPSULE ORAL 2 TIMES DAILY
Qty: 180 CAPSULE | Refills: 0 | Status: ACTIVE | OUTPATIENT
Start: 2025-06-12

## 2025-06-12 NOTE — TELEPHONE ENCOUNTER
Last seen 07/31/2024  Next appt Visit date not found    Requested Prescriptions     Pending Prescriptions Disp Refills    TECFIDERA 240 MG delayed release capsule [Pharmacy Med Name: TECFIDERA 240MG CAP / 60 COUNT BTL] 60 capsule 11     Sig: TAKE 1 CAPSULE TWICE A DAY      Plan:      Continue Tecfidera for now but will repeat CBC with differential     Labs to be obtained if unrevealing would consider electrodiagnostic studies of the legs     We will initiate gabapentin 300 mg 3 times a day     We will discuss afterwards     ALEXANDREA Osullivan - CNS  9:19 AM  7/31/2024

## 2025-07-11 ENCOUNTER — OFFICE VISIT (OUTPATIENT)
Dept: NEUROLOGY | Age: 60
End: 2025-07-11
Payer: COMMERCIAL

## 2025-07-11 VITALS
DIASTOLIC BLOOD PRESSURE: 65 MMHG | OXYGEN SATURATION: 97 % | TEMPERATURE: 98 F | RESPIRATION RATE: 20 BRPM | BODY MASS INDEX: 30.61 KG/M2 | WEIGHT: 195 LBS | HEART RATE: 72 BPM | SYSTOLIC BLOOD PRESSURE: 95 MMHG | HEIGHT: 67 IN

## 2025-07-11 DIAGNOSIS — G35 MULTIPLE SCLEROSIS (HCC): Primary | ICD-10-CM

## 2025-07-11 PROCEDURE — G8427 DOCREV CUR MEDS BY ELIG CLIN: HCPCS | Performed by: CLINICAL NURSE SPECIALIST

## 2025-07-11 PROCEDURE — G8417 CALC BMI ABV UP PARAM F/U: HCPCS | Performed by: CLINICAL NURSE SPECIALIST

## 2025-07-11 PROCEDURE — 99214 OFFICE O/P EST MOD 30 MIN: CPT | Performed by: CLINICAL NURSE SPECIALIST

## 2025-07-11 PROCEDURE — G2211 COMPLEX E/M VISIT ADD ON: HCPCS | Performed by: CLINICAL NURSE SPECIALIST

## 2025-07-11 PROCEDURE — 1036F TOBACCO NON-USER: CPT | Performed by: CLINICAL NURSE SPECIALIST

## 2025-07-11 PROCEDURE — 3017F COLORECTAL CA SCREEN DOC REV: CPT | Performed by: CLINICAL NURSE SPECIALIST

## 2025-07-11 NOTE — PROGRESS NOTES
appropriate    Cranial Nerves:  I: smell    II: visual acuity     II: visual fields Full   II: pupils MARIZOL   III,VII: ptosis None   III,IV,VI: extraocular muscles  EOMI without nystagmus    V: mastication Normal   V: facial light touch sensation  Normal   V,VII: corneal reflex     VII: facial muscle function - upper     VII: facial muscle function - lower Normal   VIII: hearing Normal   IX: soft palate elevation  Normal   IX,X: gag reflex    XI: trapezius strength  5/5   XI: sternocleidomastoid strength 5/5   XI: neck extension strength  5/5   XII: tongue strength  Normal     Motor:  5/5 throughout-Limited range of motion of right shoulder  Normal bulk and tone    Sensory:  Normal to LT  PP also appears quite normal  Vibration moderately decreased in the ankles     Coordination:   FN, FFM and GLYNN decreased bilaterally    Gait:  [place]  Cane in right hand  Left foot drop  Spastic paraparetic gait    DTR:   Right Brachioradialis reflex 0  Left Brachioradialis reflex 0  Right Biceps reflex 1+  Left Biceps reflex 1+  Right Quadriceps reflex 2+  Left Quadriceps reflex 2+  Right Achilles reflex 0  Left Achilles reflex 1    No Arreola's     Laboratory/Radiology:     CBC with Differential:    Lab Results   Component Value Date/Time    WBC 4.3 09/10/2024 03:21 PM    RBC 4.09 09/10/2024 03:21 PM    HGB 13.9 09/10/2024 03:21 PM    HCT 41.1 09/10/2024 03:21 PM     09/10/2024 03:21 PM    .5 09/10/2024 03:21 PM    MCH 34.0 09/10/2024 03:21 PM    MCHC 33.8 09/10/2024 03:21 PM    RDW 13.4 09/10/2024 03:21 PM    LYMPHOPCT 14 09/10/2024 03:21 PM    MONOPCT 10 09/10/2024 03:21 PM    EOSPCT 2 09/10/2024 03:21 PM    BASOPCT 1 09/10/2024 03:21 PM    MONOSABS 0.42 09/10/2024 03:21 PM    LYMPHSABS 0.58 09/10/2024 03:21 PM    EOSABS 0.09 09/10/2024 03:21 PM    BASOSABS 0.05 09/10/2024 03:21 PM     CMP:    Lab Results   Component Value Date/Time     09/10/2024 03:21 PM    K 3.9 09/10/2024 03:21 PM    CL 96

## 2025-08-29 RX ORDER — DIMETHYL FUMARATE 240 MG/1
CAPSULE ORAL
Qty: 60 CAPSULE | Refills: 5 | Status: ACTIVE | OUTPATIENT
Start: 2025-08-29

## 2025-09-06 ENCOUNTER — HOSPITAL ENCOUNTER (OUTPATIENT)
Dept: MRI IMAGING | Age: 60
End: 2025-09-06
Payer: COMMERCIAL

## 2025-09-06 DIAGNOSIS — G35 MULTIPLE SCLEROSIS (HCC): ICD-10-CM

## 2025-09-06 PROCEDURE — A9579 GAD-BASE MR CONTRAST NOS,1ML: HCPCS | Performed by: RADIOLOGY

## 2025-09-06 PROCEDURE — 72156 MRI NECK SPINE W/O & W/DYE: CPT

## 2025-09-06 PROCEDURE — 70553 MRI BRAIN STEM W/O & W/DYE: CPT

## 2025-09-06 PROCEDURE — 6360000004 HC RX CONTRAST MEDICATION: Performed by: RADIOLOGY

## 2025-09-06 RX ORDER — GADOTERIDOL 279.3 MG/ML
19 INJECTION INTRAVENOUS
Status: COMPLETED | OUTPATIENT
Start: 2025-09-06 | End: 2025-09-06

## 2025-09-06 RX ADMIN — GADOTERIDOL 19 ML: 279.3 INJECTION, SOLUTION INTRAVENOUS at 14:57

## 2025-09-06 RX ADMIN — GADOTERIDOL 19 ML: 279.3 INJECTION, SOLUTION INTRAVENOUS at 14:35
